# Patient Record
(demographics unavailable — no encounter records)

---

## 2024-10-31 NOTE — ASSESSMENT
[FreeTextEntry1] : Without evidence for recent infarction overt heart failure or unstable angina and based upon satisfactory non invasive cardiac testing, Mr. Pan may undergo planned hello risk surgery an ASA class II anesthesia risk. Aspirin 81 will be maintained throughout the perioperative period and even on the day of surgery given an indwelling cardiac stent. we consider resumption of a novel anticoagulant such as apixaban been based upon urologic considerations such as hematuria and thromboembolic risk. Discuss with the patient and Radha at the bedside.  Update 5/3/2023 A nuclear stress test is recommended in order to update an ischemic risk  4/17/24 he has exertional chest pains most consistent with unstable angina and cardiac catheterization was recommended Eliquis  will be held prior to planned credit catheterization  [employer] [facility] [place] [id number]  4/26/24 would reevaluate indicates for anticoagulation with echo with contrast in order to avoid bleeding risk  lisa [id number]  [facility]  Quality measures  Tobacco intervention none indicated Statin for prevention of cardiovascular disease indicated Hypertension compensated Aspirin for ischemic vascular disease indicated Tobacco screening cessation and intervention not indicated  6/13/2024  will transition from clopidogrel to aspirin 10/19/2024 in order to decrease the bleeding risk apixaban is continued given a laminated thrombus at the LV apex in order to decrease the risk of stroke I encouraged Shante to undergo GI evaluation although I would like to avoid procedures for at least 6 months after a cardiac stent if possible from a GI standpoint however we will coordinate care with GI service he sees a gastroenterologist in [place] he will discontinue Plavix 10/19/2024 and transition to Eliquis and aspirin 81 at that time in order to decrease bleeding risk he will see me in August and in October as follow-up  [employer] [facility] haro [id number]  8/15/2024  on 10/19/2024 we will discontinue Plavix after 6 months of treatment and begin baby aspirin 81 mg in combination with Eliquis to reduce bleeding risk .  Return visit 11/24  Willa noriega [id number] alma rosa  10/29/2024 Will transition from nebivolol (taper) to carvedilol for better blood pressure control and utilize Olmesartan 20 mg instead of losartan with continue amlodipine Eliquis and aspirin. Plavix is discontinued.  Anticoagulation for a left ventricular thrombus  [facility] De La Rosa [id number]   Medical necessity This is a high risk encounter based upon the recommendation for an imaging procedure cardiac catheterization known coronary disease status post stenting. And drug eval and management

## 2024-10-31 NOTE — PHYSICAL EXAM
[General Appearance - Well Developed] : well developed [Normal Appearance] : normal appearance [Well Groomed] : well groomed [General Appearance - Well Nourished] : well nourished [No Deformities] : no deformities [General Appearance - In No Acute Distress] : no acute distress [Normal Conjunctiva] : the conjunctiva exhibited no abnormalities [Eyelids - No Xanthelasma] : the eyelids demonstrated no xanthelasmas [Normal Oral Mucosa] : normal oral mucosa [No Oral Pallor] : no oral pallor [No Oral Cyanosis] : no oral cyanosis [Normal Jugular Venous A Waves Present] : normal jugular venous A waves present [Normal Jugular Venous V Waves Present] : normal jugular venous V waves present [No Jugular Venous Dubois A Waves] : no jugular venous dubois A waves [Respiration, Rhythm And Depth] : normal respiratory rhythm and effort [Exaggerated Use Of Accessory Muscles For Inspiration] : no accessory muscle use [Auscultation Breath Sounds / Voice Sounds] : lungs were clear to auscultation bilaterally [Heart Sounds] : normal S1 and S2 [Heart Rate And Rhythm] : heart rate and rhythm were normal [Murmurs] : no murmurs present [Abdomen Soft] : soft [Abdomen Tenderness] : non-tender [Abdomen Mass (___ Cm)] : no abdominal mass palpated [Abnormal Walk] : normal gait [Gait - Sufficient For Exercise Testing] : the gait was sufficient for exercise testing [Nail Clubbing] : no clubbing of the fingernails [Cyanosis, Localized] : no localized cyanosis [Petechial Hemorrhages (___cm)] : no petechial hemorrhages [Skin Color & Pigmentation] : normal skin color and pigmentation [No Venous Stasis] : no venous stasis [] : no rash [Skin Lesions] : no skin lesions [No Skin Ulcers] : no skin ulcer [No Xanthoma] : no  xanthoma was observed [Oriented To Time, Place, And Person] : oriented to person, place, and time [Affect] : the affect was normal [Mood] : the mood was normal [No Anxiety] : not feeling anxious [FreeTextEntry1] : few wheezes

## 2024-10-31 NOTE — REASON FOR VISIT
[FreeTextEntry1] : Nancie has noted hematuria along with clots in his urine. He was seen by Dr. 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 Diagnostic urologic testing is planned and he is seen today in cardiac evaluation. Nancie comes today for clarification of his  overall cardiovascular risk. He was advised to undergo a complete cardiac evaluation.He denies current chest pains shortness of breath or loss of consciousnes. Nancie was recently hospitalized under the care of Dr. Tirado and was felt to have reflux acid after endoscopy. he has noted some gum bleeding and epistaxis while on a novel anticoagulant's apixaban has been held since 4/20/22. He remains on aspirin 81 mg.   5/23/2023 Nancie comes today for an update of his overall cardiovascular risk as per Dr. Servin 5/3/2023  4/17/24 Nancie noted chest pain three weeks ago his legs have been weak  4/26/24 patient is status post RCA stent 4/19/24 DAPT was recommended for 6 mo. he says his chest feels "weird."   6/13/2024 Nancie was told to discontinue aspirin 4/26/2024 1 week after a cardiac stent we discussed dual antiplatelet therapy for at least 6 months a stent was placed 4/19/2024 he is currently on clopidogrel 75 and apixaban 5 twice daily.  He also notes bandlike pain across his upper abdomen he is status post cholecystectomy.  He otherwise feels fine bowel movements are normal without signs of bleeding  8/15/2024 Nancie doing well signs and symptoms are resolved laminated thrombus noted LV apex  10/29/2024 Nancie comes today without specific cardiac complaints aside from cough and phlegm in the throat.  Seen in ER 9/10/2024 serum troponin 22 normal CT brain no CT evidence for acute territorial infarct hematoma or mass effect hemoglobin 15 hematocrit 45. Will adjust antihypertensives based on tolerance and efficacy.  Make amlodipine 10 mg transition from losartan due to a cough phlegm in his throat dual antiplatelet therapy x 6 months EKG stable.

## 2024-10-31 NOTE — CARDIOLOGY SUMMARY
[Ant Wall Defect] : anterior wall defect [Fixed Defect] : fixed defect [LVEF ___%] : LVEF [unfilled]% [___] : [unfilled]

## 2024-11-19 NOTE — COUNSELING
[Fall prevention counseling provided] : Fall prevention counseling provided [Adequate lighting] : Adequate lighting [No throw rugs] : No throw rugs [Use proper foot wear] : Use proper foot wear [Use recommended devices] : Use recommended devices [Benefits of weight loss discussed] : Benefits of weight loss discussed [Encouraged to maintain food diary] : Encouraged to maintain food diary [Encouraged to increase physical activity] : Encouraged to increase physical activity [Encouraged to use exercise tracking device] : Encouraged to use exercise tracking device [Weigh Self Weekly] : weigh self weekly [Decrease Portions] : decrease portions [____ min/wk Activity] : [unfilled] min/wk activity [Keep Food Diary] : keep food diary [Activity counseling provided] : activity [Needs reinforcement, provided] : Patient needs reinforcement on understanding of disease, goals and obesity follow-up plan; reinforcement was provided [Good understanding] : Patient has a good understanding of lifestyle changes and the steps needed to achieve self management goals

## 2024-11-19 NOTE — HEALTH RISK ASSESSMENT
[Yes] : Yes [Monthly or less (1 pt)] : Monthly or less (1 point) [1 or 2 (0 pts)] : 1 or 2 (0 points) [Never (0 pts)] : Never (0 points) [No] : In the past 12 months have you used drugs other than those required for medical reasons? No [No falls in past year] : Patient reported no falls in the past year [0] : 2) Feeling down, depressed, or hopeless: Not at all (0) [PHQ-2 Negative - No further assessment needed] : PHQ-2 Negative - No further assessment needed [Former] : Former [de-identified] : Cardiol,  [Audit-CScore] : 1 [de-identified] : walking  [[id number]] : 0 [de-identified] : regular

## 2024-11-19 NOTE — HISTORY OF PRESENT ILLNESS
[FreeTextEntry1] : cc: f/u  pressure, hyperlipidemia , CAD , Insomnia   [de-identified] : Encounter conducted in Polish. Patient presented  to f/u on his multiple health problems, She is s/p  PTCA 04/24. He  denies CP,SOB or WOODWARD .He takes his meds daily , seen by the Cardiol today. Pt needs meds renewal, He got his Flu vacc in [facility],decilned rest of them

## 2024-11-19 NOTE — REVIEW OF SYSTEMS
[Postnasal Drip] : postnasal drip [Insomnia] : insomnia [Negative] : Neurological [Hoarseness] : no hoarseness [Abdominal Pain] : no abdominal pain [Nausea] : no nausea [Constipation] : no constipation [Diarrhea] : no diarrhea [Vomiting] : no vomiting [Heartburn] : no heartburn [Melena] : no melena [Suicidal] : not suicidal [Anxiety] : no anxiety [Depression] : no depression

## 2024-11-25 NOTE — ASSESSMENT
[FreeTextEntry1] : Without evidence for recent infarction overt heart failure or unstable angina and based upon satisfactory non invasive cardiac testing, Mr. Pan may undergo planned hello risk surgery an ASA class II anesthesia risk. Aspirin 81 will be maintained throughout the perioperative period and even on the day of surgery given an indwelling cardiac stent. we consider resumption of a novel anticoagulant such as apixaban been based upon urologic considerations such as hematuria and thromboembolic risk. Discuss with the patient and Radha at the bedside.  Update 5/3/2023 A nuclear stress test is recommended in order to update an ischemic risk  4/17/24 he has exertional chest pains most consistent with unstable angina and cardiac catheterization was recommended Eliquis  will be held prior to planned credit catheterization  [employer] [facility] Rosario [id number]  4/26/24 would reevaluate indicates for anticoagulation with echo with contrast in order to avoid bleeding risk  lisa [id number]  [facility]  Quality measures  Tobacco intervention none indicated Statin for prevention of cardiovascular disease indicated Hypertension compensated Aspirin for ischemic vascular disease indicated Tobacco screening cessation and intervention not indicated  6/13/2024  will transition from clopidogrel to aspirin 10/19/2024 in order to decrease the bleeding risk apixaban is continued given a laminated thrombus at the LV apex in order to decrease the risk of stroke I encouraged Shante to undergo GI evaluation although I would like to avoid procedures for at least 6 months after a cardiac stent if possible from a GI standpoint however we will coordinate care with GI service he sees a gastroenterologist in [place] he will discontinue Plavix 10/19/2024 and transition to Eliquis and aspirin 81 at that time in order to decrease bleeding risk he will see me in August and in October as follow-up  [facility] haro [id number]  8/15/2024  on 10/19/2024 we will discontinue Plavix after 6 months of treatment and begin baby aspirin 81 mg in combination with Eliquis to reduce bleeding risk .  Return visit 11/24  Willa noriega [id number] alma rosa  10/29/2024 Will transition from nebivolol (taper) to carvedilol for better blood pressure control and utilize Olmesartan 20 mg instead of losartan with continue amlodipine Eliquis and aspirin. Plavix is discontinued.  Anticoagulation for a left ventricular thrombus  [facility] De La Rosa [id number]  11/19/24 doing better on current regimen [id number]  Medical necessity This is a intermediate risk encounter based upon drug evaluation and managment

## 2024-11-25 NOTE — REASON FOR VISIT
[FreeTextEntry1] : Nancie has noted hematuria along with clots in his urine. He was seen by Dr. 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 Diagnostic urologic testing is planned and he is seen today in cardiac evaluation. Nancie comes today for clarification of his  overall cardiovascular risk. He was advised to undergo a complete cardiac evaluation.He denies current chest pains shortness of breath or loss of consciousnes. Nancie was recently hospitalized under the care of Dr. Tirado and was felt to have reflux acid after endoscopy. he has noted some gum bleeding and epistaxis while on a novel anticoagulant's apixaban has been held since 4/20/22. He remains on aspirin 81 mg.   5/23/2023 Nancie comes today for an update of his overall cardiovascular risk as per Dr. Servin 5/3/2023  4/17/24 Nancie noted chest pain three weeks ago his legs have been weak  4/26/24 patient is status post RCA stent 4/19/24 DAPT was recommended for 6 mo. he says his chest feels "weird."   6/13/2024 Nancie was told to discontinue aspirin 4/26/2024 1 week after a cardiac stent we discussed dual antiplatelet therapy for at least 6 months a stent was placed 4/19/2024 he is currently on clopidogrel 75 and apixaban 5 twice daily.  He also notes bandlike pain across his upper abdomen he is status post cholecystectomy.  He otherwise feels fine bowel movements are normal without signs of bleeding  8/15/2024 Nancie doing well signs and symptoms are resolved laminated thrombus noted LV apex  10/29/2024 Nancie comes today without specific cardiac complaints aside from cough and phlegm in the throat.  Seen in ER 9/10/2024 serum troponin 22 normal CT brain no CT evidence for acute territorial infarct hematoma or mass effect hemoglobin 15 hematocrit 45. Will adjust antihypertensives based on tolerance and efficacy.  Make amlodipine 10 mg transition from losartan due to a cough phlegm in his throat dual antiplatelet therapy x 6 months EKG stable.    11/19/24 doing better on current ARB

## 2024-11-25 NOTE — PHYSICAL EXAM
[General Appearance - Well Developed] : well developed [Normal Appearance] : normal appearance [Well Groomed] : well groomed [General Appearance - Well Nourished] : well nourished [No Deformities] : no deformities [General Appearance - In No Acute Distress] : no acute distress [Normal Conjunctiva] : the conjunctiva exhibited no abnormalities [Eyelids - No Xanthelasma] : the eyelids demonstrated no xanthelasmas [Normal Oral Mucosa] : normal oral mucosa [No Oral Pallor] : no oral pallor [No Oral Cyanosis] : no oral cyanosis [Normal Jugular Venous A Waves Present] : normal jugular venous A waves present [Normal Jugular Venous V Waves Present] : normal jugular venous V waves present [No Jugular Venous Dubois A Waves] : no jugular venous dubois A waves [Respiration, Rhythm And Depth] : normal respiratory rhythm and effort [Exaggerated Use Of Accessory Muscles For Inspiration] : no accessory muscle use [Auscultation Breath Sounds / Voice Sounds] : lungs were clear to auscultation bilaterally [Heart Rate And Rhythm] : heart rate and rhythm were normal [Heart Sounds] : normal S1 and S2 [Murmurs] : no murmurs present [Abdomen Soft] : soft [Abdomen Tenderness] : non-tender [Abdomen Mass (___ Cm)] : no abdominal mass palpated [Abnormal Walk] : normal gait [Gait - Sufficient For Exercise Testing] : the gait was sufficient for exercise testing [Nail Clubbing] : no clubbing of the fingernails [Cyanosis, Localized] : no localized cyanosis [Petechial Hemorrhages (___cm)] : no petechial hemorrhages [Skin Color & Pigmentation] : normal skin color and pigmentation [] : no rash [No Venous Stasis] : no venous stasis [Skin Lesions] : no skin lesions [No Skin Ulcers] : no skin ulcer [No Xanthoma] : no  xanthoma was observed [Oriented To Time, Place, And Person] : oriented to person, place, and time [Affect] : the affect was normal [Mood] : the mood was normal [No Anxiety] : not feeling anxious [FreeTextEntry1] : few wheezes

## 2025-03-25 NOTE — ASSESSMENT
[FreeTextEntry1] : Without evidence for recent infarction overt heart failure or unstable angina and based upon satisfactory non invasive cardiac testing, Mr. Pan may undergo planned hello risk surgery an ASA class II anesthesia risk. Aspirin 81 will be maintained throughout the perioperative period and even on the day of surgery given an indwelling cardiac stent. we consider resumption of a novel anticoagulant such as apixaban been based upon urologic considerations such as hematuria and thromboembolic risk. Discuss with the patient and Radha at the bedside.  Update 5/3/2023 A nuclear stress test is recommended in order to update an ischemic risk  4/17/24 he has exertional chest pains most consistent with unstable angina and cardiac catheterization was recommended Eliquis  will be held prior to planned credit catheterization  [employer] [facility] Rosario [id number]  4/26/24 would reevaluate indicates for anticoagulation with echo with contrast in order to avoid bleeding risk  lisa [id number]  [facility]  Quality measures  Tobacco intervention none indicated Statin for prevention of cardiovascular disease indicated Hypertension compensated Aspirin for ischemic vascular disease indicated Tobacco screening cessation and intervention not indicated  6/13/2024  will transition from clopidogrel to aspirin 10/19/2024 in order to decrease the bleeding risk apixaban is continued given a laminated thrombus at the LV apex in order to decrease the risk of stroke I encouraged Shante to undergo GI evaluation although I would like to avoid procedures for at least 6 months after a cardiac stent if possible from a GI standpoint however we will coordinate care with GI service he sees a gastroenterologist in [place] he will discontinue Plavix 10/19/2024 and transition to Eliquis and aspirin 81 at that time in order to decrease bleeding risk he will see me in August and in October as follow-up  [facility] haro [id number]  8/15/2024  on 10/19/2024 we will discontinue Plavix after 6 months of treatment and begin baby aspirin 81 mg in combination with Eliquis to reduce bleeding risk .  Return visit 11/24  Willa noriega [id number] alma rosa  10/29/2024 Will transition from nebivolol (taper) to carvedilol for better blood pressure control and utilize Olmesartan 20 mg instead of losartan with continue amlodipine Eliquis and aspirin. Plavix is discontinued.  Anticoagulation for a left ventricular thrombus  [facility] De La Rosa [id number]  11/19/24 doing better on current regimen [id number]  3/25/25 resume Eliquis 5 bid. make carvedilol 6.25 bid continue aspirin for stent amlodipine Olmesartan. [facility] [facility]  [id number]  Medical necessity This is a intermediate risk encounter based upon drug evaluation and managment

## 2025-03-25 NOTE — REASON FOR VISIT
[FreeTextEntry1] : Nancie has noted hematuria along with clots in his urine. He was seen by Dr. 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 Diagnostic urologic testing is planned and he is seen today in cardiac evaluation. Nancie comes today for clarification of his  overall cardiovascular risk. He was advised to undergo a complete cardiac evaluation.He denies current chest pains shortness of breath or loss of consciousnes. Nancie was recently hospitalized under the care of Dr. Tirado and was felt to have reflux acid after endoscopy. he has noted some gum bleeding and epistaxis while on a novel anticoagulant's apixaban has been held since 4/20/22. He remains on aspirin 81 mg.   5/23/2023 Nancie comes today for an update of his overall cardiovascular risk as per Dr. Servin 5/3/2023  4/17/24 Nancie noted chest pain three weeks ago his legs have been weak  4/26/24 patient is status post RCA stent 4/19/24 DAPT was recommended for 6 mo. he says his chest feels "weird."   6/13/2024 Nancie was told to discontinue aspirin 4/26/2024 1 week after a cardiac stent we discussed dual antiplatelet therapy for at least 6 months a stent was placed 4/19/2024 he is currently on clopidogrel 75 and apixaban 5 twice daily.  He also notes bandlike pain across his upper abdomen he is status post cholecystectomy.  He otherwise feels fine bowel movements are normal without signs of bleeding  8/15/2024 Nancie doing well signs and symptoms are resolved laminated thrombus noted LV apex  10/29/2024 Nancie comes today without specific cardiac complaints aside from cough and phlegm in the throat.  Seen in ER 9/10/2024 serum troponin 22 normal CT brain no CT evidence for acute territorial infarct hematoma or mass effect hemoglobin 15 hematocrit 45. Will adjust antihypertensives based on tolerance and efficacy.  Make amlodipine 10 mg transition from losartan due to a cough phlegm in his throat dual antiplatelet therapy x 6 months EKG stable.    11/19/24 doing better on current ARB   3/25/25 maddison was recently doing exercise on a ring. he was given Zannat 500 in [place] (an antibiotic ? azithromycin) with gradual improvement and resolving hematoma of his left forearm. he reduced eliqis to daily dsoing. his carvedilol was increased to 6.25 in [place]

## 2025-03-26 NOTE — HISTORY OF PRESENT ILLNESS
[FreeTextEntry1] : cc: f/u  Insomnia,  hyperlipidemia , CAD , Insomnia, left elbow problem  [de-identified] : Encounter conducted in Polish. Patient presented for f/u on his multiple echo problems, He f/u with Cardio , He  denies CP,SOB or WOODWARD. Pt needs Ambien renewal.  gained weight,  aware of importance to loss it, Pt c/o left elbow pain= s/p injury, + hematoma.

## 2025-03-26 NOTE — REVIEW OF SYSTEMS
[Postnasal Drip] : postnasal drip [Nasal Discharge] : no nasal discharge [Sore Throat] : no sore throat [Hoarseness] : no hoarseness [Abdominal Pain] : no abdominal pain [Nausea] : no nausea [Constipation] : no constipation [Diarrhea] : no diarrhea [Vomiting] : no vomiting [Heartburn] : no heartburn [Melena] : no melena [Joint Pain] : joint pain [Joint Stiffness] : no joint stiffness [Muscle Pain] : no muscle pain [Back Pain] : no back pain [Joint Swelling] : joint swelling [Suicidal] : not suicidal [Insomnia] : insomnia [Anxiety] : no anxiety [Depression] : no depression [Negative] : Neurological [FreeTextEntry9] : left elbow pain . s/p injury , resolving hematoma

## 2025-03-26 NOTE — HEALTH RISK ASSESSMENT
[Yes] : Yes [Monthly or less (1 pt)] : Monthly or less (1 point) [1 or 2 (0 pts)] : 1 or 2 (0 points) [Never (0 pts)] : Never (0 points) [No] : In the past 12 months have you used drugs other than those required for medical reasons? No [No falls in past year] : Patient reported no falls in the past year [0] : 2) Feeling down, depressed, or hopeless: Not at all (0) [PHQ-2 Negative - No further assessment needed] : PHQ-2 Negative - No further assessment needed [Audit-CScore] : 1 [de-identified] : walking  [de-identified] : regular  [[id number]] : 0 [Former] : Former [> 15 Years] : > 15 Years

## 2025-03-26 NOTE — PHYSICAL EXAM
[Normal Nasal Mucosa] : the nasal mucosa was normal [No Respiratory Distress] : no respiratory distress  [Clear to Auscultation] : lungs were clear to auscultation bilaterally [No Accessory Muscle Use] : no accessory muscle use [Normal Rate] : normal rate  [Regular Rhythm] : with a regular rhythm [Normal S1, S2] : normal S1 and S2 [No Murmur] : no murmur heard [No Varicosities] : no varicosities [No Edema] : there was no peripheral edema [Soft] : abdomen soft [Non Tender] : non-tender [Non-distended] : non-distended [No HSM] : no HSM [Normal Bowel Sounds] : normal bowel sounds [Normal Sphincter Tone] : normal sphincter tone [No Mass] : no mass [No Joint Swelling] : no joint swelling [Normal Gait] : normal gait [Coordination Grossly Intact] : coordination grossly intact [No Focal Deficits] : no focal deficits [Alert and Oriented x3] : oriented to person, place, and time [de-identified] : obese  [de-identified] : right side lower back + tenderness,no skin changes

## 2025-03-27 NOTE — PHYSICAL EXAM
[de-identified] : Patient is WDWN, alert, and in no acute distress. Breathing is unlabored. He is grossly oriented to person, place, and time.  Left Elbow: Inspection/Palpation: tenderness , edema, no deformities. No skin lesions or discoloration. Range of Motion: Full extension and flexion. Full forearm pronation/supination. No crepitance. Strength: Flexion and extension 5/5 Stability: No joint instability on provocative testing. [de-identified] : AP, lateral and oblique views of the left elbow were obtained today and revealed no abnormalities. No acute fracture. No dislocation. Cartilage spaces are maintained. Olencranon bursitis

## 2025-03-27 NOTE — HISTORY OF PRESENT ILLNESS
[de-identified] : Pt is a 72 y/o male c/o left elbow pain and swelling.  Denies injury. He was exercising 2 weeks ago with his elbows on the floor and he developed swelling and ecchymosis.  The elbow is tender.  The swelling has increased.  He has not had this evaluated. He sates he is on eliquis and baby aspirin.

## 2025-03-27 NOTE — ADDENDUM
[FreeTextEntry1] : I, Lakesha Michele wrote this note acting as a scribe for Dr. Oniel Chaudhari on 03/27/2025.   All medical record entries made by the Scribe were at my, Dr. Oniel Chaudhari MD., direction and personally dictated by me on 03/27/2025. I have personally reviewed the chart and agree that the record accurately reflects my personal performance of the history, physical exam, assessment and plan.

## 2025-03-27 NOTE — DISCUSSION/SUMMARY
[de-identified] :  The underlying pathophysiology was reviewed with the patient. XR films were reviewed with the patient. Discussed at length the nature of the patients condition. The left elbow symptoms are secondary to olecranon bursitis and a hematoma.   At this time, treatment options were discussed including; observation, aspiration, and surgical excision. At this point, I recommend an aspiration. Under sterile precautions,  0.2 cc of  dark blood was aspirated from the left elbow. The patient was informed that there is a probability that the cyst will refill and may warrant another aspiration. He may return to this office if this occurs. An ace wrap was put on.   I reassured the patient that his residual symptoms are within the nature of diagnosis and will heal with time.   Patient was advised to try OTC medication and topical analgesics for pain management. I recommend that the patient utilize Voltaren gel, Icy Hot, Biofreeze, Bengay, or 4% lidocaine patches.  Gentle range of motion, stretching, and strengthening exercises were encouraged. Increase activity as tolerated. Do not put pressure on left elbow.   All questions answered, understanding verbalized. Patient in agreement with plan of care. The patient can follow-up in 6 weeks or as needed.  If the patient begins to experience any changes or severe exacerbation of his symptoms, he should reach out to me as soon as possible.   .

## 2025-05-13 NOTE — HISTORY OF PRESENT ILLNESS
[de-identified] : Pt is a 72 y/o male c/o left elbow pain and swelling.  Denies injury. He was exercising 2 weeks ago with his elbows on the floor and he developed swelling and ecchymosis.  The elbow is tender.  The swelling has increased.  He has not had this evaluated. He sates he is on eliquis and baby aspirin.    Today, 05/13/2025, the patient presents for a follow-up evaluation and further care. He is here for a check up of his elbow and c/o of left foot pain.

## 2025-05-13 NOTE — PHYSICAL EXAM
[de-identified] : Patient is WDWN, alert, and in no acute distress. Breathing is unlabored. He is grossly oriented to person, place, and time.  Left Elbow: Inspection/Palpation no: tenderness , edema, no deformities. No skin lesions or discoloration. Range of Motion: Full extension and flexion. Full forearm pronation/supination. No crepitance. Strength: Flexion and extension 5/5 Stability: No joint instability on provocative testing.  Left foot: Edema and tenderness in great and 2nd toe FROM [de-identified] : AP, lateral and oblique views of the left foot were obtained today and revealed mild great toe MTP osteoarthritis.    AP, lateral and oblique views of the left elbow were obtained previously and revealed no abnormalities. No acute fracture. No dislocation. Cartilage spaces are maintained.

## 2025-05-13 NOTE — DISCUSSION/SUMMARY
[de-identified] :  The underlying pathophysiology was reviewed with the patient. XR films were reviewed with the patient. Discussed at length the nature of the patients condition. The left foot symptoms are secondary to osteoarthritis.   Treatment options were discussed including; observation, NSAIDS, analgesics.   Patient was advised to try OTC medication and topical analgesics for pain management. I recommend that the patient utilize Tylenol, Advil, Aleve, Voltaren gel, Icy Hot, Biofreeze, Bengay, or 4% lidocaine patches.  Gentle range of motion, stretching, and strengthening exercises were encouraged. Increase activity as tolerated. Wear comfortable shoes.   Patient may continue participating in all physical activities without restrictions, as tolerated.  All questions answered, understanding verbalized. Patient in agreement with plan of care. The patient can follow-up as needed.  If the patient begins to experience any changes or severe exacerbation of his symptoms, he should reach out to me as soon as possible.

## 2025-05-13 NOTE — ADDENDUM
[FreeTextEntry1] : I, Lakesha Michele wrote this note acting as a scribe for Dr. Oniel Chaudhari on 05/13/2025.   All medical record entries made by the Scribe were at my, Dr. Oniel Chaudhari MD., direction and personally dictated by me on 05/13/2025. I have personally reviewed the chart and agree that the record accurately reflects my personal performance of the history, physical exam, assessment and plan.

## 2025-06-19 NOTE — HISTORY OF PRESENT ILLNESS
[Preoperative Visit] : for a medical evaluation prior to surgery [Scheduled Procedure ___] : a [unfilled] [Date of Surgery ___] : on [unfilled] [Surgeon Name ___] : surgeon: [unfilled] [Stable] : Stable [FreeTextEntry1] :  [facility] FAIRCHILD comes today for evaluation prior to planned hernia. he was advised to undergo a complete cardiac evaluation. He denies chest pains shortness of breath or loss of consciousness.  He may have some side effects from his antihypertensives, and amlodipine is made 5 mg twice daily and carvedilol 12.5 twice daily.  He feels some pounding after medications

## 2025-06-19 NOTE — ASSESSMENT
[FreeTextEntry1] : Nancie is over 1 year since a cardiac stent 4/19/2024 and therefore elective surgery may be considered. Without evidence for recent infarction overt heart failure or unstable angina and based upon satisfactory noninvasive cardiac testing, Mr. Pan may undergo planned low risk surgery hernia repair at an ASA class II anesthesia risk. Aspirin 81 will be maintained throughout the perioperative period and even on the day of surgery given an indwelling cardiac stent.  Apixaban may be held for 4 doses prior to the procedure and resumed when able from a surgical standpoint the risk is greater than the benefit.   Language line interpreters [place] [id number] [place] [id number]

## 2025-06-19 NOTE — PHYSICAL EXAM
[General Appearance - Well Developed] : well developed [Normal Appearance] : normal appearance [Well Groomed] : well groomed [General Appearance - Well Nourished] : well nourished [No Deformities] : no deformities [General Appearance - In No Acute Distress] : no acute distress [Normal Conjunctiva] : the conjunctiva exhibited no abnormalities [Eyelids - No Xanthelasma] : the eyelids demonstrated no xanthelasmas [Normal Oral Mucosa] : normal oral mucosa [No Oral Pallor] : no oral pallor [No Oral Cyanosis] : no oral cyanosis [Normal Jugular Venous A Waves Present] : normal jugular venous A waves present [Normal Jugular Venous V Waves Present] : normal jugular venous V waves present [No Jugular Venous Dubois A Waves] : no jugular venous dubois A waves [Respiration, Rhythm And Depth] : normal respiratory rhythm and effort [Exaggerated Use Of Accessory Muscles For Inspiration] : no accessory muscle use [Auscultation Breath Sounds / Voice Sounds] : lungs were clear to auscultation bilaterally [FreeTextEntry1] : few wheezes [Heart Rate And Rhythm] : heart rate and rhythm were normal [Heart Sounds] : normal S1 and S2 [Murmurs] : no murmurs present [Abdomen Soft] : soft [Abdomen Tenderness] : non-tender [Abdomen Mass (___ Cm)] : no abdominal mass palpated [Abnormal Walk] : normal gait [Gait - Sufficient For Exercise Testing] : the gait was sufficient for exercise testing [Nail Clubbing] : no clubbing of the fingernails [Cyanosis, Localized] : no localized cyanosis [Petechial Hemorrhages (___cm)] : no petechial hemorrhages [Skin Color & Pigmentation] : normal skin color and pigmentation [] : no rash [No Venous Stasis] : no venous stasis [Skin Lesions] : no skin lesions [No Skin Ulcers] : no skin ulcer [No Xanthoma] : no  xanthoma was observed [Oriented To Time, Place, And Person] : oriented to person, place, and time [Affect] : the affect was normal [Mood] : the mood was normal [No Anxiety] : not feeling anxious

## 2025-06-20 NOTE — HISTORY OF PRESENT ILLNESS
[de-identified] : This 71 YEAR OLD MAN UNDERWENT REPAIR OF AN LIH THIRTY YEARS AGO. RECENTLY HE HAS BEEN AWARE OF ALEFT GROIN MASS WHICH BECOMING UNCOMFORTABLE. HE DENIES ANY RECENT CHANGE IN BOWEL HABITS.

## 2025-06-20 NOTE — ASSESSMENT
[FreeTextEntry1] : Discussion regarding all options and risks To undergo repair of LIH on 7/3/25 Discussed with Medicine All lab values and imaging studies reviewed

## 2025-06-26 NOTE — HISTORY OF PRESENT ILLNESS
[Preoperative Visit] : for a medical evaluation prior to surgery [Scheduled Procedure ___] : a [unfilled] [Date of Surgery ___] : on [unfilled] [Surgeon Name ___] : surgeon: [unfilled] [Good] : Good [Cardiovascular Disease] : cardiovascular disease [Frequent use of NSAIDs] : frequent use of NSAIDs [Prior Anesthesia] : Prior anesthesia [Electrocardiogram] : ~T an ECG ~C was performed [Echocardiogram] : ~T an echocardiogram ~C was performed [Cardiovascular Stress Test] : a cardiac stress test ~T ~C was performed [Fever] : no fever [Chills] : no chills [Fatigue] : no fatigue [Chest Pain] : no chest pain [Cough] : no cough [Dyspnea] : no dyspnea [Dysuria] : no dysuria [Urinary Frequency] : no urinary frequency [Nausea] : no nausea [Vomiting] : no vomiting [Abdominal Pain] : no abdominal pain [Lower Extremity Swelling] : no lower extremity swelling [Poor Exercise Tolerance] : no poor exercise tolerance [Pulmonary Disease] : no pulmonary disease [Anti-Platelet Agents] : no anti-platelet agents [Nicotine Dependence] : no nicotine dependence [Alcohol Use] : no  alcohol use [Renal Disease] : no renal disease [GI Disease] : no gastrointestinal disease [Sleep Apnea] : no sleep apnea [Thromboembolic Problems] : no thromboembolic problems [Transfusion Reaction] : no transfusion reaction [Impaired Immunity] : no impaired immunity [Steroid Use in Last 6 Months] : no steroid use in the last six months [Frequent Aspirin Use] : no frequent aspirin use [Prev Anesthesia Reaction] : no previous anesthesia reaction [Anesthesia Reaction] : no anesthesia reaction [Sudden Death] : no sudden death [Clotting Disorder] : no clotting disorder [Bleeding Disorder] : no bleeding disorder [de-identified] :  [Coronary Artery Disease] : coronary artery disease [No Pertinent Pulmonary History] : no history of asthma, COPD, sleep apnea, or smoking [No Adverse Anesthesia Reaction] : no adverse anesthesia reaction in self or family member [Chronic Anticoagulation] : chronic anticoagulation [(Patient denies any chest pain, claudication, dyspnea on exertion, orthopnea, palpitations or syncope)] : Patient denies any chest pain, claudication, dyspnea on exertion, orthopnea, palpitations or syncope [Moderate (4-6 METs)] : Moderate (4-6 METs) [Anti-Platelet Agents: _____] : Anti-Platelet Agents: [unfilled] [Anticoagulants: _____] : Anticoagulants: [unfilled] [Atrial Fibrillation] : atrial fibrillation [Aortic Stenosis] : no aortic stenosis [Recent Myocardial Infarction] : no recent myocardial infarction [Implantable Device/Pacemaker] : no implantable device/pacemaker [Chronic Kidney Disease] : no chronic kidney disease [Diabetes] : no diabetes [FreeTextEntry1] : Left inguinal hernia repair  [FreeTextEntry2] : 07/03/25 [FreeTextEntry3] :   [FreeTextEntry4] : Pt denies CP,SOB, abd pain, no N,no V. HE c/o left inguinal pain on and off.

## 2025-06-26 NOTE — ADDENDUM
[FreeTextEntry1] : Blood work results and EKG from 5/1/2017  -  noted. Patient  medically optimized  for proposed procedure.

## 2025-06-26 NOTE — HISTORY OF PRESENT ILLNESS
[Preoperative Visit] : for a medical evaluation prior to surgery [Scheduled Procedure ___] : a [unfilled] [Date of Surgery ___] : on [unfilled] [Surgeon Name ___] : surgeon: [unfilled] [Good] : Good [Cardiovascular Disease] : cardiovascular disease [Frequent use of NSAIDs] : frequent use of NSAIDs [Prior Anesthesia] : Prior anesthesia [Electrocardiogram] : ~T an ECG ~C was performed [Echocardiogram] : ~T an echocardiogram ~C was performed [Cardiovascular Stress Test] : a cardiac stress test ~T ~C was performed [Fever] : no fever [Chills] : no chills [Fatigue] : no fatigue [Chest Pain] : no chest pain [Cough] : no cough [Dyspnea] : no dyspnea [Dysuria] : no dysuria [Urinary Frequency] : no urinary frequency [Nausea] : no nausea [Vomiting] : no vomiting [Abdominal Pain] : no abdominal pain [Lower Extremity Swelling] : no lower extremity swelling [Poor Exercise Tolerance] : no poor exercise tolerance [Pulmonary Disease] : no pulmonary disease [Anti-Platelet Agents] : no anti-platelet agents [Nicotine Dependence] : no nicotine dependence [Alcohol Use] : no  alcohol use [Renal Disease] : no renal disease [GI Disease] : no gastrointestinal disease [Sleep Apnea] : no sleep apnea [Thromboembolic Problems] : no thromboembolic problems [Transfusion Reaction] : no transfusion reaction [Impaired Immunity] : no impaired immunity [Steroid Use in Last 6 Months] : no steroid use in the last six months [Frequent Aspirin Use] : no frequent aspirin use [Prev Anesthesia Reaction] : no previous anesthesia reaction [Anesthesia Reaction] : no anesthesia reaction [Sudden Death] : no sudden death [Clotting Disorder] : no clotting disorder [Bleeding Disorder] : no bleeding disorder [de-identified] :  [Coronary Artery Disease] : coronary artery disease [No Pertinent Pulmonary History] : no history of asthma, COPD, sleep apnea, or smoking [No Adverse Anesthesia Reaction] : no adverse anesthesia reaction in self or family member [Chronic Anticoagulation] : chronic anticoagulation [(Patient denies any chest pain, claudication, dyspnea on exertion, orthopnea, palpitations or syncope)] : Patient denies any chest pain, claudication, dyspnea on exertion, orthopnea, palpitations or syncope [Moderate (4-6 METs)] : Moderate (4-6 METs) [Anti-Platelet Agents: _____] : Anti-Platelet Agents: [unfilled] [Anticoagulants: _____] : Anticoagulants: [unfilled] [Atrial Fibrillation] : atrial fibrillation [Aortic Stenosis] : no aortic stenosis [Recent Myocardial Infarction] : no recent myocardial infarction [Implantable Device/Pacemaker] : no implantable device/pacemaker [Chronic Kidney Disease] : no chronic kidney disease [Diabetes] : no diabetes [FreeTextEntry1] : Left inguinal hernia repair  [FreeTextEntry2] : 07/03/25 [FreeTextEntry3] :   [FreeTextEntry4] : Pt denies CP,SOB, abd pain, no N,no V. HE c/o left inguinal pain on and off.

## 2025-06-26 NOTE — PHYSICAL EXAM
[General Appearance - Alert] : alert [General Appearance - In No Acute Distress] : in no acute distress [Sclera] : the sclera and conjunctiva were normal [Examination Of The Oral Cavity] : the lips and gums were normal [Both Tympanic Membranes Were Examined] : both tympanic membranes were normal [Oropharynx] : the oropharynx was normal [] : the neck was supple [Thyroid Diffuse Enlargement] : the thyroid was not enlarged [Respiration, Rhythm And Depth] : normal respiratory rhythm and effort [Exaggerated Use Of Accessory Muscles For Inspiration] : no accessory muscle use [Auscultation Breath Sounds / Voice Sounds] : lungs were clear to auscultation bilaterally [Apical Impulse] : the apical impulse was normal [Heart Rate And Rhythm] : heart rate was normal and rhythm regular [Heart Sounds] : normal S1 and S2 [Bowel Sounds] : normal bowel sounds [Abdomen Soft] : soft [RUQ] : in the right upper quadrant [Cervical Lymph Nodes Enlarged Posterior Bilaterally] : posterior cervical [Cervical Lymph Nodes Enlarged Anterior Bilaterally] : anterior cervical [Supraclavicular Lymph Nodes Enlarged Bilaterally] : supraclavicular [Axillary Lymph Nodes Enlarged Bilaterally] : axillary [Skin Color & Pigmentation] : normal skin color and pigmentation [Oriented To Time, Place, And Person] : oriented to person, place, and time [FreeTextEntry1] : Obese [Over the Past 2 Weeks, Have You Felt Down, Depressed, or Hopeless?] : 1.) Over the past 2 weeks, have you felt down, depressed, or hopeless? No [Over the Past 2 Weeks, Have You Felt Little Interest or Pleasure Doing Things?] : 2.) Over the past 2 weeks, have you felt little interest or pleasure doing things? No [No Acute Distress] : no acute distress [No Edema] : there was no peripheral edema [No Joint Swelling] : no joint swelling [No Rash] : no rash [No Focal Deficits] : no focal deficits [Normal Gait] : normal gait [Alert and Oriented x3] : oriented to person, place, and time [Normal] : normal rate, regular rhythm, normal S1 and S2 and no murmur heard [No CVA Tenderness] : no CVA  tenderness

## 2025-06-26 NOTE — REVIEW OF SYSTEMS
[see HPI] : see HPI [Abdominal Pain] : no abdominal pain [Nausea] : no nausea [Constipation] : no constipation [Diarrhea] : no diarrhea [Vomiting] : no vomiting [Heartburn] : no heartburn [Melena] : no melena [Negative] : Heme/Lymph [FreeTextEntry7] : left inguinal hernia

## 2025-06-26 NOTE — ASSESSMENT
[Procedure Intermediate Risk] : the procedure risk is intermediate [Patient Intermediate Risk] : the patient is an intermediate risk [Optimized for Surgery Pending Laboratory Results] : the patient is optimized for surgery pending laboratory results [FreeTextEntry3] : ASA as per Surgeon  [High Risk Surgery - Intraperitoneal, Intrathoracic or Supringuinal Vascular Procedures] : High Risk Surgery - Intraperitoneal, Intrathoracic or Supringuinal Vascular Procedures - No (0) [Ischemic Heart Disease] : Ischemic Heart Disease  - Yes (1) [Congestive Heart Failure] : Congestive Heart Failure - Yes (1) [Prior Cerebrovascular Accident or TIA] : Prior Cerebrovascular Accident or TIA - No (0) [Creatinine >= 2mg/dL (1 Point)] : Creatinine >= 2mg/dL - No (0) [Insulin-dependent Diabetic (1 Point)] : Insulin-dependent Diabetic - No (0) [1] : 1 , RCRI Class: II, Risk of Post-Op Cardiac Complications: 6.0%, 95% CI for Risk Estimate: 4.9% - 7.4% [Patient Optimized for Surgery] : Patient optimized for surgery [Modify anticoagulant treatment prior to procedure] : Modify anticoagulant treatment prior to procedure [Modify anti-platelet treatment prior to procedure] : Modify anti-platelet treatment prior to procedure [Modify medications prior to procedure] : Modify medications prior to procedure [As per surgery] : as per surgery [FreeTextEntry4] : Patient was cleared by  his Cardiologist, he  is medically optimized for the proposed procedure.  [FreeTextEntry5] : as per Cardiol [FreeTextEntry6] : as per Cardiol  [FreeTextEntry7] : Hold all vitamins/supplements for 1 week,

## 2025-06-26 NOTE — CURRENT MEDS
GONZALEZ (acute kidney injury) [Takes medication as prescribed] : takes [None] : Patient does not have any barriers to medication adherence [Yes] : Reviewed medication list for presence of high-risk medications. [Benzodiazepines] : benzodiazepines

## 2025-07-14 NOTE — PHYSICAL EXAM
[Abdominal Masses] : No abdominal masses [Abdomen Tenderness] : ~T ~M No abdominal tenderness [de-identified] : LIH incision healing well